# Patient Record
Sex: FEMALE | Race: BLACK OR AFRICAN AMERICAN | Employment: OTHER | URBAN - METROPOLITAN AREA
[De-identification: names, ages, dates, MRNs, and addresses within clinical notes are randomized per-mention and may not be internally consistent; named-entity substitution may affect disease eponyms.]

---

## 2017-02-22 ENCOUNTER — TELEPHONE (OUTPATIENT)
Dept: SLEEP MEDICINE | Age: 72
End: 2017-02-22

## 2017-02-22 ENCOUNTER — HOSPITAL ENCOUNTER (OUTPATIENT)
Dept: SLEEP MEDICINE | Age: 72
Discharge: HOME | End: 2017-02-22
Payer: MEDICARE

## 2017-02-22 DIAGNOSIS — J45.909 ASTHMA: ICD-10-CM

## 2017-02-22 DIAGNOSIS — I50.9 CHF (CONGESTIVE HEART FAILURE) (HCC): ICD-10-CM

## 2017-02-22 DIAGNOSIS — E04.9 NONTOXIC GOITER: ICD-10-CM

## 2017-02-22 DIAGNOSIS — G47.33 OSA (OBSTRUCTIVE SLEEP APNEA): ICD-10-CM

## 2017-02-22 DIAGNOSIS — M10.9 GOUT: ICD-10-CM

## 2017-02-22 DIAGNOSIS — E78.5 HYPERLIPIDEMIA: ICD-10-CM

## 2017-02-22 DIAGNOSIS — N28.9 RENAL INSUFFICIENCY: ICD-10-CM

## 2017-02-22 PROCEDURE — 95810 POLYSOM 6/> YRS 4/> PARAM: CPT

## 2017-02-23 NOTE — PROGRESS NOTES
 Sleep study completed per physician order.  Patient discharged from sleep center.  Patient awake, alert and able to leave the facility under their own power.  Instructed to follow up with their sleep physician for results.

## 2017-02-24 ENCOUNTER — DOCUMENTATION ONLY (OUTPATIENT)
Dept: SLEEP MEDICINE | Age: 72
End: 2017-02-24

## 2017-02-24 NOTE — PROGRESS NOTES
Sleep study scored per the [employer] Academy [employer] (AASM) Manual for the Scoring of Sleep and Associated Events, Rules, Terminology and Technical Specifications Visual and Cardiac Rules, 2007. V2.2    Preliminary diagnostic sleep study report scanned in to patient's chart.

## 2017-03-08 ENCOUNTER — TELEPHONE (OUTPATIENT)
Dept: SLEEP MEDICINE | Age: 72
End: 2017-03-08

## 2017-03-08 NOTE — TELEPHONE ENCOUNTER
Called patient to ask if sleep study could be moved to tomorrow night as a tech called out. Patient agreed to move study to tomorrow night.

## 2017-03-09 ENCOUNTER — HOSPITAL ENCOUNTER (OUTPATIENT)
Dept: SLEEP MEDICINE | Age: 72
Discharge: HOME | End: 2017-03-09
Attending: PSYCHIATRY & NEUROLOGY
Payer: MEDICARE

## 2017-03-09 DIAGNOSIS — G47.33 OSA (OBSTRUCTIVE SLEEP APNEA): ICD-10-CM

## 2017-03-09 PROCEDURE — 95811 POLYSOM 6/>YRS CPAP 4/> PARM: CPT

## 2017-03-10 ENCOUNTER — DOCUMENTATION ONLY (OUTPATIENT)
Dept: SLEEP MEDICINE | Age: 72
End: 2017-03-10

## 2017-03-11 NOTE — PROGRESS NOTES
Sleep study scored per the American Academy [employer] (AASM) Manual for the Scoring of Sleep and Associated Events, Rules, Terminology and Technical Specifications Visual and Cardiac Rules, 2007. V2.2    Preliminary titration sleep study report scanned in to patient's chart and routed to Dr. Sahra Anderson for review.